# Patient Record
Sex: FEMALE | Race: WHITE | NOT HISPANIC OR LATINO | Employment: FULL TIME | URBAN - METROPOLITAN AREA
[De-identification: names, ages, dates, MRNs, and addresses within clinical notes are randomized per-mention and may not be internally consistent; named-entity substitution may affect disease eponyms.]

---

## 2024-10-14 ENCOUNTER — HOSPITAL ENCOUNTER (EMERGENCY)
Facility: HOSPITAL | Age: 39
Discharge: HOME | End: 2024-10-14
Payer: COMMERCIAL

## 2024-10-14 ENCOUNTER — APPOINTMENT (OUTPATIENT)
Dept: RADIOLOGY | Facility: HOSPITAL | Age: 39
End: 2024-10-14
Payer: COMMERCIAL

## 2024-10-14 VITALS
TEMPERATURE: 98.4 F | RESPIRATION RATE: 20 BRPM | HEART RATE: 81 BPM | SYSTOLIC BLOOD PRESSURE: 137 MMHG | OXYGEN SATURATION: 97 % | WEIGHT: 170 LBS | DIASTOLIC BLOOD PRESSURE: 84 MMHG

## 2024-10-14 DIAGNOSIS — S62.635A CLOSED DISPLACED FRACTURE OF DISTAL PHALANX OF LEFT RING FINGER, INITIAL ENCOUNTER: Primary | ICD-10-CM

## 2024-10-14 PROCEDURE — 99283 EMERGENCY DEPT VISIT LOW MDM: CPT

## 2024-10-14 PROCEDURE — 73140 X-RAY EXAM OF FINGER(S): CPT | Mod: LT

## 2024-10-14 PROCEDURE — 73140 X-RAY EXAM OF FINGER(S): CPT | Mod: LEFT SIDE | Performed by: RADIOLOGY

## 2024-10-14 RX ORDER — OXYCODONE AND ACETAMINOPHEN 5; 325 MG/1; MG/1
1 TABLET ORAL EVERY 6 HOURS PRN
Qty: 10 TABLET | Refills: 0 | Status: SHIPPED | OUTPATIENT
Start: 2024-10-14 | End: 2024-10-17

## 2024-10-14 ASSESSMENT — COLUMBIA-SUICIDE SEVERITY RATING SCALE - C-SSRS
1. IN THE PAST MONTH, HAVE YOU WISHED YOU WERE DEAD OR WISHED YOU COULD GO TO SLEEP AND NOT WAKE UP?: NO
6. HAVE YOU EVER DONE ANYTHING, STARTED TO DO ANYTHING, OR PREPARED TO DO ANYTHING TO END YOUR LIFE?: NO
2. HAVE YOU ACTUALLY HAD ANY THOUGHTS OF KILLING YOURSELF?: NO

## 2024-10-14 NOTE — ED PROVIDER NOTES
HPI   Chief Complaint   Patient presents with    Hand Pain     Pt arrives private auto from home. States she fell off a mechanical bull yesterday injuring her left hand/ring finger. Swelling noted to digit.        This is a 39-year-old right-hand-dominant female who presents with a left fourth finger injury.  She reports yesterday she was attempting to get onto a mechanical bull.  As she reached for the rope she somehow injured her finger.  She is unsure what happened.  She has had pain in the finger since then, swelling and limited range of motion.  She denies weakness, loss of function or paresthesias.  Her fifth acrylic fingernail was torn off but the finger itself is fine.  She does not have any other complaints at this time.              Patient History   History reviewed. No pertinent past medical history.  History reviewed. No pertinent surgical history.  No family history on file.  Social History     Tobacco Use    Smoking status: Not on file    Smokeless tobacco: Not on file   Substance Use Topics    Alcohol use: Not on file    Drug use: Not on file       Physical Exam   ED Triage Vitals [10/14/24 0928]   Temperature Heart Rate Respirations BP   36.9 °C (98.4 °F) 81 20 137/84      Pulse Ox Temp src Heart Rate Source Patient Position   97 % -- -- --      BP Location FiO2 (%)     -- --       Physical Exam    Appearance: Alert and oriented.  Well-nourished well-developed female sitting in no acute distress.    Head: Normocephalic,  atraumatic.    Eyes: PERRLA, EOMI.    ENT: Moist mucous membranes.    Cardiac: Regular rate and rhythm.  No murmur.    Pulmonary: Lungs clear bilaterally with good aeration.  No audible wheezing, rales or rhonchi.    Abdomen: Soft with normoactive bowel sounds throughout.    Musculoskeletal: Tenderness over the left fourth PIP joint, DIP joint and distal phalanx.  There is mild swelling and erythema noted over the distal phalanx.  Limited flexion at the DIP joint secondary to pain  and swelling.  She is neurovascular intact throughout.    Neurological: No focal or lateralizing deficit.    Psychiatric: Appropriate mood and affect.       ED Course & MDM   Diagnoses as of 10/14/24 1128   Closed displaced fracture of distal phalanx of left ring finger, initial encounter                 No data recorded     Steffany Coma Scale Score: 15 (10/14/24 0929 : Praveen Castellanos LPN)                           Medical Decision Making  Differential diagnosis: Finger sprain, finger fracture, finger dislocation, finger contusion    This is a 39-year-old right-hand-dominant female who presents with a left fourth finger injury.  Yesterday she was attempting to get out of mechanical bull when she somehow injured her fourth finger.  She has had pain and swelling since then.  She reports limited range of motion.  On examination she has tenderness over the left fourth PIP joint, DIP joint and distal phalanx.  There is mild swelling and erythema noted to the distal phalanx.  Acrylic nail noted to be intact.  She is also neurovascularly intact.  X-rays reveals an acute fracture at the dorsal aspect of the base of the distal phalanx with intra-articular extension.  She was informed of these findings and placed in a finger splint.  She will be provided with a short course of Percocet and instructed to supplement with over-the-counter analgesics.  She was referred to orthopedic hand for follow-up.  She will return if she develops new or worsening symptoms.  She verbalized understanding and is amenable with the plan.        Procedure  Procedures     Zhanna Bautista PA-C  10/14/24 1129

## 2025-01-03 ENCOUNTER — APPOINTMENT (OUTPATIENT)
Dept: RADIOLOGY | Facility: HOSPITAL | Age: 40
End: 2025-01-03
Payer: COMMERCIAL

## 2025-01-03 ENCOUNTER — HOSPITAL ENCOUNTER (EMERGENCY)
Facility: HOSPITAL | Age: 40
Discharge: HOME | End: 2025-01-03
Attending: STUDENT IN AN ORGANIZED HEALTH CARE EDUCATION/TRAINING PROGRAM
Payer: COMMERCIAL

## 2025-01-03 VITALS
SYSTOLIC BLOOD PRESSURE: 183 MMHG | OXYGEN SATURATION: 97 % | HEIGHT: 64 IN | BODY MASS INDEX: 29.53 KG/M2 | DIASTOLIC BLOOD PRESSURE: 95 MMHG | WEIGHT: 173 LBS | HEART RATE: 77 BPM | RESPIRATION RATE: 18 BRPM | TEMPERATURE: 98.8 F

## 2025-01-03 DIAGNOSIS — S76.312A HAMSTRING STRAIN, LEFT, INITIAL ENCOUNTER: Primary | ICD-10-CM

## 2025-01-03 PROCEDURE — 73560 X-RAY EXAM OF KNEE 1 OR 2: CPT | Mod: LEFT SIDE | Performed by: RADIOLOGY

## 2025-01-03 PROCEDURE — 99284 EMERGENCY DEPT VISIT MOD MDM: CPT | Performed by: STUDENT IN AN ORGANIZED HEALTH CARE EDUCATION/TRAINING PROGRAM

## 2025-01-03 PROCEDURE — 73552 X-RAY EXAM OF FEMUR 2/>: CPT | Mod: LT

## 2025-01-03 PROCEDURE — 73560 X-RAY EXAM OF KNEE 1 OR 2: CPT | Mod: LT

## 2025-01-03 PROCEDURE — 2500000001 HC RX 250 WO HCPCS SELF ADMINISTERED DRUGS (ALT 637 FOR MEDICARE OP)

## 2025-01-03 PROCEDURE — 73552 X-RAY EXAM OF FEMUR 2/>: CPT | Mod: LEFT SIDE | Performed by: RADIOLOGY

## 2025-01-03 RX ORDER — OXYCODONE HYDROCHLORIDE 5 MG/1
5 TABLET ORAL EVERY 6 HOURS PRN
Qty: 12 TABLET | Refills: 0 | Status: SHIPPED | OUTPATIENT
Start: 2025-01-03 | End: 2025-01-06

## 2025-01-03 RX ORDER — OXYCODONE AND ACETAMINOPHEN 5; 325 MG/1; MG/1
1 TABLET ORAL ONCE
Status: COMPLETED | OUTPATIENT
Start: 2025-01-03 | End: 2025-01-03

## 2025-01-03 RX ADMIN — OXYCODONE HYDROCHLORIDE AND ACETAMINOPHEN 1 TABLET: 5; 325 TABLET ORAL at 19:10

## 2025-01-03 ASSESSMENT — COLUMBIA-SUICIDE SEVERITY RATING SCALE - C-SSRS
6. HAVE YOU EVER DONE ANYTHING, STARTED TO DO ANYTHING, OR PREPARED TO DO ANYTHING TO END YOUR LIFE?: NO
2. HAVE YOU ACTUALLY HAD ANY THOUGHTS OF KILLING YOURSELF?: NO
1. IN THE PAST MONTH, HAVE YOU WISHED YOU WERE DEAD OR WISHED YOU COULD GO TO SLEEP AND NOT WAKE UP?: NO

## 2025-01-03 ASSESSMENT — PAIN DESCRIPTION - ORIENTATION: ORIENTATION: LEFT;POSTERIOR;UPPER

## 2025-01-03 ASSESSMENT — PAIN SCALES - GENERAL: PAINLEVEL_OUTOF10: 8

## 2025-01-03 ASSESSMENT — PAIN - FUNCTIONAL ASSESSMENT: PAIN_FUNCTIONAL_ASSESSMENT: 0-10

## 2025-01-03 ASSESSMENT — PAIN DESCRIPTION - PAIN TYPE: TYPE: ACUTE PAIN

## 2025-01-03 ASSESSMENT — PAIN DESCRIPTION - DESCRIPTORS: DESCRIPTORS: THROBBING;SHOOTING

## 2025-01-03 ASSESSMENT — PAIN DESCRIPTION - LOCATION: LOCATION: LEG

## 2025-01-03 NOTE — ED PROVIDER NOTES
HPI   Chief Complaint   Patient presents with    Leg Injury     PT. STATES WAS GETTING HER DOG WHEN SHE FELT SOMETHING SNAP IN HER POSTERIOR LEFT THIGH, NOW WITH SEVERE PAIN TO LEG. PT. ALSO C/O SOME TINGLING TO LEG. PT. LIMPING WHEN ATTEMPTING TO AMBULATE.        39-year-old otherwise healthy female presents to the ED due to pain in the posterior left thigh.  Patient states she was bending over to  her dog when she hyperextended her left leg and felt a pop.  Patient was able to ambulate into the ED with a limp due to increasing in pain with weightbearing.  Reports the pain radiates up the back of her left thigh but does not extend into the buttock. States she did not fall after the injury occurred. Denies any pain or numbness and tingling to the left hip, knee, calf, foot. Denies taking any medications prior to arrival.  No prior injuries reported to the left leg.              Patient History   History reviewed. No pertinent past medical history.  History reviewed. No pertinent surgical history.  No family history on file.  Social History     Tobacco Use    Smoking status: Never    Smokeless tobacco: Never   Vaping Use    Vaping status: Unknown   Substance Use Topics    Alcohol use: Yes     Comment: SOCIALLY    Drug use: Not Currently       Physical Exam   ED Triage Vitals [01/03/25 1818]   Temperature Heart Rate Respirations BP   37.1 °C (98.8 °F) 83 16 (!) 183/95      Pulse Ox Temp Source Heart Rate Source Patient Position   95 % Temporal Monitor Sitting      BP Location FiO2 (%)     Left arm --       Physical Exam  Constitutional:       Comments: Patient is not ill-appearing, in no acute distress, but appears in discomfort.   Cardiovascular:      Rate and Rhythm: Normal rate and regular rhythm.   Pulmonary:      Effort: Pulmonary effort is normal.      Breath sounds: Normal breath sounds.   Musculoskeletal:         General: Signs of injury present.      Comments: Significant tenderness to palpation along  the back of the left thigh.  No swelling, erythema, ecchymosis noted.  Limited ROM with flexion and extension of the knee secondary to pain.  Compartments are soft.  DP and PT 2+, cap refill less than 2 seconds, full sensation intact.   Neurological:      General: No focal deficit present.      Mental Status: She is alert.           ED Course & MDM   Diagnoses as of 01/03/25 2020   Hamstring strain, left, initial encounter                 No data recorded     Steffany Coma Scale Score: 15 (01/03/25 1820 : Soraya Almanzar RN)                           Medical Decision Making  39-year-old otherwise healthy female presents to the ED due to pain in the posterior left thigh. Upon arrival to the ED, patient is hypertensive 183/95 likely secondary to pain, afebrile, without tachycardia, 95% SpO2 on RA.  Significant tenderness to palpation of the left hamstring, no visible deformity or bruising noted.  Limited ROM with flexion and extension of the left knee. Compartments are soft. LLE neurovascularly intact.  X-ray of the left knee and femur ordered.  Patient received oxycodone for pain while in the ED. Patient was advised about her imaging findings, all questions and concerns were answered.  A consult was placed to follow-up with orthopedics as soon as possible.  Before discharge, left knee was placed in an immobilizer and she was provided crutches.  She was provided a prescription for oxycodone for 3 days for severe pain, advised to use Tylenol/Motrin for mild pain. Instructed to rest, ice, and elevate.  Patient was agreeable to plan of care and discharge with close follow-up with ortho for a possible hamstring strain/tear.        Procedure  Procedures     Francine Graff PA-C  01/03/25 2028

## 2025-01-03 NOTE — ED TRIAGE NOTES
PT. STATES WAS GETTING HER DOG WHEN SHE FELT SOMETHING SNAP IN HER POSTERIOR LEFT THIGH, NOW WITH SEVERE PAIN TO LEG. PT. ALSO C/O SOME TINGLING TO LEG. PT. LIMPING WHEN ATTEMPTING TO AMBULATE.

## 2025-01-04 NOTE — DISCHARGE INSTRUCTIONS
Prescribed oxycodone for severe pain.  Tylenol and Motrin as needed for mild pain.  Orthopedic referral was placed.  Please follow-up as soon as possible.  Return to the ED with any new or worsening symptoms.  Keep the left knee immobilized and use the crutches until follow-up. Encouraged to rest, ice and elevate.

## 2025-01-06 ENCOUNTER — OFFICE VISIT (OUTPATIENT)
Dept: ORTHOPEDIC SURGERY | Facility: CLINIC | Age: 40
End: 2025-01-06
Payer: COMMERCIAL

## 2025-01-06 VITALS — WEIGHT: 173 LBS | HEIGHT: 64 IN | BODY MASS INDEX: 29.53 KG/M2

## 2025-01-06 DIAGNOSIS — S76.312A HAMSTRING STRAIN, LEFT, INITIAL ENCOUNTER: Primary | ICD-10-CM

## 2025-01-06 PROCEDURE — 99204 OFFICE O/P NEW MOD 45 MIN: CPT | Performed by: STUDENT IN AN ORGANIZED HEALTH CARE EDUCATION/TRAINING PROGRAM

## 2025-01-06 PROCEDURE — 3008F BODY MASS INDEX DOCD: CPT | Performed by: STUDENT IN AN ORGANIZED HEALTH CARE EDUCATION/TRAINING PROGRAM

## 2025-01-06 PROCEDURE — 99214 OFFICE O/P EST MOD 30 MIN: CPT | Performed by: STUDENT IN AN ORGANIZED HEALTH CARE EDUCATION/TRAINING PROGRAM

## 2025-01-06 RX ORDER — NAPROXEN 500 MG/1
500 TABLET ORAL
Qty: 28 TABLET | Refills: 1 | Status: SHIPPED | OUTPATIENT
Start: 2025-01-06 | End: 2025-02-03

## 2025-01-06 ASSESSMENT — PATIENT HEALTH QUESTIONNAIRE - PHQ9
2. FEELING DOWN, DEPRESSED OR HOPELESS: NOT AT ALL
1. LITTLE INTEREST OR PLEASURE IN DOING THINGS: NOT AT ALL
SUM OF ALL RESPONSES TO PHQ9 QUESTIONS 1 AND 2: 0

## 2025-01-06 NOTE — PROGRESS NOTES
Acute Injury New Patient Visit    HPI: Fatmata is a 39 y.o.female who presents today with new complaints of left leg injury.  On 1/3/2025, she tried to bend over and grab her dog, and felt a sharp pain in the posterior thigh.  She was seen at the emergency department at [place] and found to have negative x-rays of the left femur.  She states that she was placed on crutches and immobilizer.  The crutches helped, but the immobilizer made it worse.  She is also been trying rest, ice, and pain medication from the ED, which she did not need to take yesterday.  She denies any significant swelling or bruising.  She is able to walk better last night.  She denies any numbness tingling.  She denies any back pain.    Plan: For this left mild hamstring strain, we will start her naproxen, provide her with an Ace wrap, continue with compression, ice, heat, and elevation, and use crutches as needed.  We will order physical therapy in case she is not better in approximately 2 weeks.  Follow-up in 2 weeks with no repeat x-rays.    Assessment:   Problem List Items Addressed This Visit    None  Visit Diagnoses       Hamstring strain, left, initial encounter    -  Primary    Relevant Orders    Referral to Physical Therapy            Diagnostics: Reviewed all relevant imaging including x-ray, MRI, CT, and US.      Procedure:  Procedures    Physical Exam:  GENERAL:  No obvious acute distress.  NEURO:  Distally neurovascularly intact.  Sensation intact to light touch.  Extremity: Exam of the left leg reveals skin is intact no signs infection.  There is no significant swelling, bruising, erythema, warmth, rash.  She is tender palpation into the muscle belly of the hamstrings muscle with no tenderness along the ischial tuberosity or the distal tendons of the hamstrings.  She has some pain with resisted flexion at the knee.    Orders Placed This Encounter    Referral to Physical Therapy      At the conclusion of the visit there were no further  questions by the patient/family regarding their plan of care.  Patient was instructed to call or return with any issues, questions, or concerns regarding their injury and/or treatment plan described above.     01/06/25 at 12:31 PM - Mac Celis,     Office: [phone]    This note was prepared using voice recognition software.  The details of this note are correct and have been reviewed, and corrected to the best of my ability.  Some grammatical errors may persist related to the Dragon software.

## 2025-01-20 ENCOUNTER — APPOINTMENT (OUTPATIENT)
Dept: ORTHOPEDIC SURGERY | Facility: CLINIC | Age: 40
End: 2025-01-20
Payer: COMMERCIAL

## 2025-09-10 ENCOUNTER — APPOINTMENT (OUTPATIENT)
Dept: PRIMARY CARE | Facility: CLINIC | Age: 40
End: 2025-09-10
Payer: COMMERCIAL